# Patient Record
Sex: MALE | Race: WHITE | NOT HISPANIC OR LATINO | Employment: FULL TIME | URBAN - METROPOLITAN AREA
[De-identification: names, ages, dates, MRNs, and addresses within clinical notes are randomized per-mention and may not be internally consistent; named-entity substitution may affect disease eponyms.]

---

## 2021-03-17 ENCOUNTER — OCCUPATIONAL HEALTH (OUTPATIENT)
Dept: URGENT CARE | Facility: CLINIC | Age: 51
End: 2021-03-17

## 2021-03-17 DIAGNOSIS — Z13.9 ENCOUNTER FOR SCREENING: Primary | ICD-10-CM

## 2021-03-17 PROCEDURE — 84202 ASSAY RBC PROTOPORPHYRIN: CPT | Mod: S$GLB,,, | Performed by: PREVENTIVE MEDICINE

## 2021-03-17 PROCEDURE — 84202 LEAD STANDARD PROFILE, BLOOD: ICD-10-PCS | Mod: S$GLB,,, | Performed by: PREVENTIVE MEDICINE

## 2021-04-20 ENCOUNTER — OCCUPATIONAL HEALTH (OUTPATIENT)
Dept: URGENT CARE | Facility: CLINIC | Age: 51
End: 2021-04-20

## 2021-04-20 DIAGNOSIS — Z23 ENCOUNTER FOR IMMUNIZATION: Primary | ICD-10-CM

## 2021-04-20 PROCEDURE — 84202 ASSAY RBC PROTOPORPHYRIN: CPT | Mod: S$GLB,,, | Performed by: PREVENTIVE MEDICINE

## 2021-04-20 PROCEDURE — 84202 PR  ASSAY RBC PROTOPORPHYRIN: ICD-10-PCS | Mod: S$GLB,,, | Performed by: PREVENTIVE MEDICINE

## 2021-05-19 ENCOUNTER — OCCUPATIONAL HEALTH (OUTPATIENT)
Dept: URGENT CARE | Facility: CLINIC | Age: 51
End: 2021-05-19

## 2021-05-19 PROCEDURE — 84202 PR  ASSAY RBC PROTOPORPHYRIN: ICD-10-PCS | Mod: S$GLB,,, | Performed by: PREVENTIVE MEDICINE

## 2021-05-19 PROCEDURE — 84202 ASSAY RBC PROTOPORPHYRIN: CPT | Mod: S$GLB,,, | Performed by: PREVENTIVE MEDICINE

## 2022-02-16 ENCOUNTER — OCCUPATIONAL HEALTH (OUTPATIENT)
Dept: URGENT CARE | Facility: CLINIC | Age: 52
End: 2022-02-16

## 2022-02-16 DIAGNOSIS — Z13.9 ENCOUNTER FOR SCREENING: Primary | ICD-10-CM

## 2022-02-16 PROCEDURE — 84202 PR  ASSAY RBC PROTOPORPHYRIN: ICD-10-PCS | Mod: S$GLB,,, | Performed by: PREVENTIVE MEDICINE

## 2022-02-16 PROCEDURE — 99080 SPECIAL REPORTS OR FORMS: CPT | Mod: S$GLB,,, | Performed by: PREVENTIVE MEDICINE

## 2022-02-16 PROCEDURE — 94010 BREATHING CAPACITY TEST: CPT | Mod: S$GLB,,, | Performed by: PREVENTIVE MEDICINE

## 2022-02-16 PROCEDURE — 84202 ASSAY RBC PROTOPORPHYRIN: CPT | Mod: S$GLB,,, | Performed by: PREVENTIVE MEDICINE

## 2022-02-16 PROCEDURE — 99002 ON-SITE MASK FIT: ICD-10-PCS | Mod: S$GLB,,, | Performed by: PREVENTIVE MEDICINE

## 2022-02-16 PROCEDURE — 99080 OSHA QUESTIONNAIRE: ICD-10-PCS | Mod: S$GLB,,, | Performed by: PREVENTIVE MEDICINE

## 2022-02-16 PROCEDURE — 99002 DEVICE HANDLING PHYS/QHP: CPT | Mod: S$GLB,,, | Performed by: PREVENTIVE MEDICINE

## 2022-02-16 PROCEDURE — 94010 PULMONARY FUNCTION SCREENING (OCC MED_PHYSICALS): ICD-10-PCS | Mod: S$GLB,,, | Performed by: PREVENTIVE MEDICINE

## 2022-02-17 LAB
LEAD BLDV-MCNC: 3 UG/DL (ref 0–4)
ZPP RBC-MCNC: 17 UG/DL (ref 0–99)

## 2022-05-05 ENCOUNTER — OCCUPATIONAL HEALTH (OUTPATIENT)
Dept: URGENT CARE | Facility: CLINIC | Age: 52
End: 2022-05-05

## 2022-05-05 DIAGNOSIS — Z13.9 ENCOUNTER FOR SCREENING: Primary | ICD-10-CM

## 2022-05-05 PROCEDURE — 84202 ASSAY RBC PROTOPORPHYRIN: CPT | Mod: S$GLB,,, | Performed by: PREVENTIVE MEDICINE

## 2022-05-05 PROCEDURE — 84202 LEAD STANDARD PROFILE, BLOOD: ICD-10-PCS | Mod: S$GLB,,, | Performed by: PREVENTIVE MEDICINE

## 2022-06-17 ENCOUNTER — OCCUPATIONAL HEALTH (OUTPATIENT)
Dept: URGENT CARE | Facility: CLINIC | Age: 52
End: 2022-06-17

## 2022-06-17 DIAGNOSIS — Z13.9 ENCOUNTER FOR SCREENING: Primary | ICD-10-CM

## 2022-06-17 PROCEDURE — 84202 ASSAY RBC PROTOPORPHYRIN: CPT | Mod: S$GLB,,, | Performed by: PREVENTIVE MEDICINE

## 2022-06-17 PROCEDURE — 84202 LEAD STANDARD PROFILE, BLOOD: ICD-10-PCS | Mod: S$GLB,,, | Performed by: PREVENTIVE MEDICINE

## 2022-07-28 ENCOUNTER — OCCUPATIONAL HEALTH (OUTPATIENT)
Dept: URGENT CARE | Facility: CLINIC | Age: 52
End: 2022-07-28

## 2022-07-28 DIAGNOSIS — Z13.9 ENCOUNTER FOR SCREENING: Primary | ICD-10-CM

## 2022-07-28 PROCEDURE — 84202 ASSAY RBC PROTOPORPHYRIN: CPT | Mod: S$GLB,,,

## 2022-07-28 PROCEDURE — 84202 LEAD STANDARD PROFILE, BLOOD: ICD-10-PCS | Mod: S$GLB,,,

## 2023-01-23 ENCOUNTER — OCCUPATIONAL HEALTH (OUTPATIENT)
Dept: URGENT CARE | Facility: CLINIC | Age: 53
End: 2023-01-23

## 2023-01-23 DIAGNOSIS — Z13.9 ENCOUNTER FOR SCREENING: Primary | ICD-10-CM

## 2023-01-23 PROCEDURE — 84202 ASSAY RBC PROTOPORPHYRIN: CPT | Mod: S$GLB,,,

## 2023-01-23 PROCEDURE — 84202 LEAD STANDARD PROFILE, BLOOD: ICD-10-PCS | Mod: S$GLB,,,

## 2023-03-31 ENCOUNTER — OCCUPATIONAL HEALTH (OUTPATIENT)
Dept: URGENT CARE | Facility: CLINIC | Age: 53
End: 2023-03-31

## 2023-03-31 DIAGNOSIS — Z13.9 ENCOUNTER FOR SCREENING: Primary | ICD-10-CM

## 2023-03-31 PROCEDURE — 84202 ASSAY RBC PROTOPORPHYRIN: CPT | Mod: S$GLB,,, | Performed by: PHYSICIAN ASSISTANT

## 2023-03-31 PROCEDURE — 84202 LEAD STANDARD PROFILE, BLOOD: ICD-10-PCS | Mod: S$GLB,,, | Performed by: PHYSICIAN ASSISTANT

## 2023-04-26 ENCOUNTER — OCCUPATIONAL HEALTH (OUTPATIENT)
Dept: URGENT CARE | Facility: CLINIC | Age: 53
End: 2023-04-26

## 2023-04-26 DIAGNOSIS — Z13.9 ENCOUNTER FOR SCREENING: Primary | ICD-10-CM

## 2023-04-26 PROCEDURE — 99002 DEVICE HANDLING PHYS/QHP: CPT | Mod: S$GLB,,,

## 2023-04-26 PROCEDURE — 99080 OSHA QUESTIONNAIRE: ICD-10-PCS | Mod: S$GLB,,,

## 2023-04-26 PROCEDURE — 99080 SPECIAL REPORTS OR FORMS: CPT | Mod: S$GLB,,,

## 2023-04-26 PROCEDURE — 94010 BREATHING CAPACITY TEST: CPT | Mod: S$GLB,,,

## 2023-04-26 PROCEDURE — 94010 PULMONARY FUNCTION SCREENING (OCC MED PHYSICALS): ICD-10-PCS | Mod: S$GLB,,,

## 2023-04-26 PROCEDURE — 99002 MASK FIT-QUANTITATIVE: ICD-10-PCS | Mod: S$GLB,,,

## 2023-04-26 PROCEDURE — 84202 ASSAY RBC PROTOPORPHYRIN: CPT | Mod: S$GLB,,,

## 2023-04-26 PROCEDURE — 84202 LEAD STANDARD PROFILE, BLOOD: ICD-10-PCS | Mod: S$GLB,,,

## 2023-08-01 ENCOUNTER — OCCUPATIONAL HEALTH (OUTPATIENT)
Dept: URGENT CARE | Facility: CLINIC | Age: 53
End: 2023-08-01

## 2023-08-01 DIAGNOSIS — Z13.9 ENCOUNTER FOR SCREENING: Primary | ICD-10-CM

## 2023-08-01 PROCEDURE — 84202 ASSAY RBC PROTOPORPHYRIN: CPT | Mod: S$GLB,,,

## 2023-08-01 PROCEDURE — 84202 LEAD STANDARD PROFILE, BLOOD: ICD-10-PCS | Mod: S$GLB,,,

## 2024-01-18 ENCOUNTER — OCCUPATIONAL HEALTH (OUTPATIENT)
Dept: URGENT CARE | Facility: CLINIC | Age: 54
End: 2024-01-18
Payer: COMMERCIAL

## 2024-01-18 DIAGNOSIS — Z13.9 ENCOUNTER FOR SCREENING: Primary | ICD-10-CM

## 2024-01-18 PROCEDURE — 84202 ASSAY RBC PROTOPORPHYRIN: CPT | Mod: S$GLB,,, | Performed by: SURGERY

## 2024-03-21 ENCOUNTER — OCCUPATIONAL HEALTH (OUTPATIENT)
Dept: URGENT CARE | Facility: CLINIC | Age: 54
End: 2024-03-21

## 2024-03-21 PROCEDURE — 80305 DRUG TEST PRSMV DIR OPT OBS: CPT | Mod: S$GLB,,,

## 2024-03-21 PROCEDURE — 99499 UNLISTED E&M SERVICE: CPT | Mod: S$GLB,,,

## 2024-12-02 ENCOUNTER — OFFICE VISIT (OUTPATIENT)
Dept: PRIMARY CARE CLINIC | Facility: CLINIC | Age: 54
End: 2024-12-02
Payer: COMMERCIAL

## 2024-12-02 VITALS
WEIGHT: 138 LBS | OXYGEN SATURATION: 96 % | SYSTOLIC BLOOD PRESSURE: 130 MMHG | BODY MASS INDEX: 21.66 KG/M2 | HEIGHT: 67 IN | RESPIRATION RATE: 18 BRPM | HEART RATE: 92 BPM | DIASTOLIC BLOOD PRESSURE: 82 MMHG

## 2024-12-02 DIAGNOSIS — Z79.899 OTHER LONG TERM (CURRENT) DRUG THERAPY: ICD-10-CM

## 2024-12-02 DIAGNOSIS — Z13.6 SCREENING FOR CARDIOVASCULAR CONDITION: ICD-10-CM

## 2024-12-02 DIAGNOSIS — Z11.4 ENCOUNTER FOR SCREENING FOR HIV: ICD-10-CM

## 2024-12-02 DIAGNOSIS — R68.89 LACK OF INTEREST: ICD-10-CM

## 2024-12-02 DIAGNOSIS — R53.83 FATIGUE, UNSPECIFIED TYPE: ICD-10-CM

## 2024-12-02 DIAGNOSIS — Z76.89 ENCOUNTER TO ESTABLISH CARE: Primary | ICD-10-CM

## 2024-12-02 DIAGNOSIS — F33.1 MODERATE EPISODE OF RECURRENT MAJOR DEPRESSIVE DISORDER: ICD-10-CM

## 2024-12-02 DIAGNOSIS — R35.0 FREQUENCY OF URINATION: ICD-10-CM

## 2024-12-02 DIAGNOSIS — Z11.59 NEED FOR HEPATITIS C SCREENING TEST: ICD-10-CM

## 2024-12-02 PROCEDURE — 99999 PR PBB SHADOW E&M-EST. PATIENT-LVL IV: CPT | Mod: PBBFAC,,, | Performed by: STUDENT IN AN ORGANIZED HEALTH CARE EDUCATION/TRAINING PROGRAM

## 2024-12-02 PROCEDURE — 93010 ELECTROCARDIOGRAM REPORT: CPT | Mod: S$GLB,,, | Performed by: INTERNAL MEDICINE

## 2024-12-02 NOTE — PROGRESS NOTES
Assessment:       1. Encounter to establish care    2. Moderate episode of recurrent major depressive disorder    3. Lack of interest    4. Fatigue, unspecified type    5. Screening for cardiovascular condition    6. Need for hepatitis C screening test    7. Encounter for screening for HIV    8. Other long term (current) drug therapy    9. Frequency of urination           Plan:     Assessment & Plan    Evaluated chest pain, noting its characteristics make cardiac origin less likely but not impossible  Considered family history of heart disease and patient's age in recommending baseline EKG  Assessed acid reflux symptoms, recommending lifestyle modifications and OTC treatment  Evaluated for depression, noting symptoms of low energy, decreased appetite, and feelings of self-doubt  Considered thyroid dysfunction as a potential cause of fatigue and weight gain  Will check thyroid function, complete blood count, and metabolic panel to rule out underlying medical causes for symptoms  Discussed potential benefits of short-term counseling for addressing depression symptoms  Considered but decided against starting antidepressant medication at this time, given patient's preference and potential side effect of increased fatigue    GASTRO-ESOPHAGEAL REFLUX DISEASE:  - Explained common triggers for acid reflux: caffeine, carbonation, alcohol, tobacco, spicy foods, and red sauces.  - Discussed the mechanism of action for antacid medications, noting that they work better when taken preventatively before meals.  - Billy to identify and avoid personal acid reflux triggers.  - Continued famotidine (OTC antacid) as needed for acid reflux symptoms.  - Recommend taking before dinner to help with nighttime reflux symptoms.    DEPRESSION:  - Educated on the benefits of exercise for depression management, noting studies showing 150 minutes of moderate intensity exercise per week can be as effective as antidepressant medications.  -  Emphasized the importance of having short-term and long-term goals for mental well-being and motivation.  - Billy to continue regular exercise, aiming for 150 minutes of moderate intensity activity per week.  - Recommend setting short-term and long-term personal goals to improve mental outlook and motivation.  - Referred to counselor Miller Jon for short-term counseling (approximately 6 weeks) to address depression symptoms.    GENERAL HEALTH SCREENING:  - Ordered EKG, thyroid function test, complete blood count, metabolic panel, and PSA test.    FOLLOW-UP:  - Follow up after lab results are available to discuss findings and potential next steps.             Encounter to establish care    Moderate episode of recurrent major depressive disorder  -     Ambulatory referral/consult to Primary Care Behavioral Health (Non-Opioids); Future; Expected date: 12/09/2024  -     CBC Auto Differential; Future; Expected date: 12/02/2024  -     Hemoglobin A1C; Future; Expected date: 12/02/2024  -     Lipid Panel; Future; Expected date: 12/02/2024  -     Comprehensive Metabolic Panel; Future; Expected date: 12/02/2024  -     TSH; Future; Expected date: 12/02/2024  -     T4, Free; Future; Expected date: 12/02/2024  -     EKG 12-lead    Lack of interest    Fatigue, unspecified type  -     CBC Auto Differential; Future; Expected date: 12/02/2024  -     Hemoglobin A1C; Future; Expected date: 12/02/2024  -     Lipid Panel; Future; Expected date: 12/02/2024  -     Comprehensive Metabolic Panel; Future; Expected date: 12/02/2024  -     TSH; Future; Expected date: 12/02/2024  -     T4, Free; Future; Expected date: 12/02/2024  -     EKG 12-lead    Screening for cardiovascular condition    Need for hepatitis C screening test  -     Hepatitis C Antibody; Future; Expected date: 12/02/2024    Encounter for screening for HIV  -     HIV 1/2 Ag/Ab (4th Gen); Future; Expected date: 12/02/2024    Other long term (current) drug therapy  -     CBC  Auto Differential; Future; Expected date: 12/02/2024  -     Hemoglobin A1C; Future; Expected date: 12/02/2024  -     Lipid Panel; Future; Expected date: 12/02/2024  -     Comprehensive Metabolic Panel; Future; Expected date: 12/02/2024  -     TSH; Future; Expected date: 12/02/2024  -     T4, Free; Future; Expected date: 12/02/2024  -     EKG 12-lead    Frequency of urination  -     PSA, Screening; Future; Expected date: 12/02/2024                This note was generated with the assistance of ambient listening technology. Verbal consent was obtained by the patient and accompanying visitor(s) for the recording of patient appointment to facilitate this note. I attest to having reviewed and edited the generated note for accuracy, though some syntax or spelling errors may persist. Please contact the author of this note for any clarification.      Subjective:           Patient ID: Billy Arevalo   Age:  54 y.o.  Sex: male     Chief Complaint:   Establish Care      History of Present Illness:    Billy Arevalo is a 54 y.o. male who presents today with a chief complaint of Establish Care  .    Has less interest.  Does enjoy fishing, but not much else.  Watches TV and does not get out.    Little interest or pleasure in doing things: Several days  Feeling down, depressed, or hopeless: Nearly every day  Trouble falling or staying asleep, or sleeping too much: Nearly every day  Feeling tired or having little energy: Nearly every day  Poor appetite or overeating: Nearly every day  Feeling bad about yourself - or that you are a failure or have let yourself or your family down: Not at all  Trouble concentrating on things, such as reading the newspaper or watching television: Nearly every day  Moving or speaking so slowly that other people could have noticed. Or the opposite - being so fidgety or restless that you have been moving around a lot more than usual: Nearly every day  Thoughts that you would be better off  dead, or of hurting yourself in some way: Not at all  PHQ-9 Total Score: 19     PHQ-9 Total Score: 19    Does get out to fish and exercise. Feels well when active.  But when is not around family will self loath.    History of Present Illness    CHIEF COMPLAINT:  Billy presents today with concerns about depression and chest pain.    DEPRESSION:  He reports experiencing depression characterized by low energy and interest, feeling tired and having low energy nearly every day. This has affected his appetite, leading to eating less. He feels bad about himself every day and has been more slow and lethargic daily.    CHEST PAIN AND GERD:  He experiences chest pain that occurs at rest and lasts a few seconds, without associated numbness, tingling, or nausea. He reports more frequent heartburn and is taking OTC famotidine, which is effective if taken regularly. He discontinued coffee consumption about a month ago due to acid reflux and stomach issues, noting some improvement in his reflux symptoms. He now only drinks coffee occasionally, approximately once every two weeks.    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 He stopped working at this job at the end of March and started a new position at [employer] on , no longer requiring lead level monitoring.    SOCIAL HISTORY:  He is a former smoker who quit on  after 38 years. He also quit marijuana use on  after 38 years. He drinks whiskey about once a week and is sexually active with his wife.    FAMILY HISTORY:  His mother passed away from COPD and lung cancer. His father  of a heart attack at the age of 54. Both maternal grandparents had diabetes, and his mother was borderline diabetic.    SURGICAL HISTORY:  He reports a history of tonsillectomy with no other surgical procedures  "mentioned.      ROS:  General: +fatigue, +loss of appetite  Cardiovascular: +chest pain  Gastrointestinal: -nausea, +heartburn  Neurological: -numbness, -tingling  Psychiatric: +depression           Review of Systems   Constitutional:  Positive for fatigue. Negative for fever.   HENT: Negative.  Negative for congestion, rhinorrhea, sinus pressure and sinus pain.    Eyes: Negative.    Respiratory: Negative.  Negative for cough, chest tightness, shortness of breath and wheezing.    Cardiovascular:  Positive for chest pain (at time, mild but some concerns.). Negative for palpitations and leg swelling.   Gastrointestinal: Negative.  Negative for abdominal distention, constipation, diarrhea, nausea and vomiting.   Endocrine: Negative.    Genitourinary: Negative.  Negative for difficulty urinating, frequency and urgency.   Musculoskeletal: Negative.  Negative for arthralgias, gait problem and joint swelling.   Skin: Negative.    Allergic/Immunologic: Negative for food allergies.   Neurological:  Negative for weakness and headaches.   Psychiatric/Behavioral:  Positive for decreased concentration and dysphoric mood. Negative for sleep disturbance. The patient is nervous/anxious.            Objective:        Vitals:    12/02/24 1358   BP: 130/82   BP Location: Right arm   Patient Position: Sitting   Pulse: 92   Resp: 18   SpO2: 96%   Weight: 62.6 kg (138 lb 0.1 oz)   Height: 5' 7" (1.702 m)       Body mass index is 21.62 kg/m².      Physical Exam  Constitutional:       Appearance: Normal appearance. He is not toxic-appearing.      Comments: As per BMI.   HENT:      Head: Normocephalic and atraumatic.      Right Ear: External ear normal.      Left Ear: External ear normal.      Nose: No congestion.      Mouth/Throat:      Mouth: Mucous membranes are moist.      Pharynx: Oropharynx is clear.   Eyes:      Extraocular Movements: Extraocular movements intact.      Conjunctiva/sclera: Conjunctivae normal.   Cardiovascular:      " "Rate and Rhythm: Normal rate and regular rhythm.      Heart sounds: No murmur heard.  Pulmonary:      Effort: Pulmonary effort is normal. No respiratory distress.      Breath sounds: No wheezing.   Abdominal:      General: Bowel sounds are normal.      Palpations: Abdomen is soft.   Musculoskeletal:         General: No swelling.      Cervical back: Normal range of motion.   Skin:     General: Skin is warm.      Capillary Refill: Capillary refill takes less than 2 seconds.      Coloration: Skin is not jaundiced.   Neurological:      General: No focal deficit present.      Mental Status: He is alert and oriented to person, place, and time.      Motor: No weakness.   Psychiatric:         Mood and Affect: Mood normal.         Physical Exam    Cardiovascular: Heart sounds even, no big murmurs.  Vitals: BMI: 21.6. Blood pressure looks fine.               No past medical history on file.    No results found for: "NA", "K", "CL", "CO2", "BUN", "CREATININE", "GLUCOSE", "ANIONGAP"  No results found for: "HGBA1C"  No results found for: "BNP", "BNPTRIAGEBLO"    No results found for: "WBC", "HGB", "HCT", "PLT", "GRAN"  No results found for: "CHOL", "HDL", "LDLCALC", "TRIG"     No outpatient encounter medications on file as of 12/2/2024.     No facility-administered encounter medications on file as of 12/2/2024.            "

## 2024-12-03 ENCOUNTER — TELEPHONE (OUTPATIENT)
Dept: BEHAVIORAL HEALTH | Facility: CLINIC | Age: 54
End: 2024-12-03
Payer: COMMERCIAL

## 2024-12-03 LAB
OHS QRS DURATION: 80 MS
OHS QTC CALCULATION: 422 MS

## 2024-12-03 NOTE — PROGRESS NOTES
Behavioral Health Community Health Worker  Initial Assessment  Completed by:  Fernanda Kaur     Date:  12/3/2024    Patient Enrollment in Behavioral Health Program:  Patient verbalized understanding of Behavioral Health Integration services to include:  Patient understands that CHW, LCSW, PharmD and consulting Psychiatrist are members of the care team working collaboratively with his/her primary care provider: Yes  Patient understands that activation of their [facility] patient portal account is required for accessing the full scope of team services: Yes  Patient understands that some counseling sessions may occur via video: Yes  Clinic visits with the psychiatrist may be subject to a co-pay based on your insurance: Yes  Patient consents to enroll in BHI program: Yes    Assessments     Single Item Health Literacy Scale:  How often do you need to have someone help you read instructions, pamphlets or other written material from your doctor or pharmacy?: Never    Promis 10:  Promis 10 Responses  In general, would you say your health is: Good  In general, would you say your quality of life is: Very good  In general, how would you rate your physical health?: Good  In general, how would you rate your mental health, including your mood and your ability to think?: Fair  In general, how would you rate your satisfaction with your social activities and relationships?: Good  In general, please rate how well you carry out your usual social activities and roles. (This includes activities at home, at work and in your community, and responsibilities as a parent, child, spouse, employee, friend, etc.): Good  To what extent are you able to carry out your everyday physical activities such as walking, climbing stairs, carrying groceries, or moving a chair? : Mostly  How often have you been bothered by emotional problems such as feeling anxious, depressed or irritable?: Often  In the past 7 days, how would you rate your fatigue on  average?: Severe  In the past 7 days, on a scale of 0 to 10 (where 0 is no pain and 10 is the worst pain imaginable) how would you rate your pain on average?: 0  Global Physical Health: 11  Global Mental health Score: 13    Depression PHQ:      2024     2:53 PM   PHQ9   Little interest or pleasure in doing things 1   Feeling down, depressed, or hopeless 3   Trouble falling or staying asleep, or sleeping too much 3   Feeling tired or having little energy 3   Poor appetite or overeating 3   Feeling bad about yourself - or that you are a failure or have let yourself or your family down 0   Trouble concentrating on things, such as reading the newspaper or watching television 3   Moving or speaking so slowly that other people could have noticed. Or the opposite - being so fidgety or restless that you have been moving around a lot more than usual 3   Thoughts that you would be better off dead, or of hurting yourself in some way 0   PHQ-9 Total Score 19         Generalized Anxiety Disorder 7-Item Scale:      12/3/2024     4:26 PM   GAD7   1. Feeling nervous, anxious, or on edge? 1   2. Not being able to stop or control worrying? 2   3. Worrying too much about different things? 2   4. Trouble relaxing? 1   5. Being so restless that it is hard to sit still? 1   6. Becoming easily annoyed or irritable? 2   7. Feeling afraid as if something awful might happen? 2   8. If you checked off any problems, how difficult have these problems made it for you to do your work, take care of things at home, or get along with other people? 1   PASQUALE-7 Score 11       History     Social History     Socioeconomic History    Marital status:    Tobacco Use    Smoking status: Former     Current packs/day: 0.00     Average packs/day: 1 pack/day for 39.1 years (39.1 ttl pk-yrs)     Types: Cigarettes     Start date: 12/15/1984     Quit date: 2024     Years since quittin.9    Smokeless tobacco: Never   Substance and Sexual Activity  "   Alcohol use: Yes     Alcohol/week: 1.0 standard drink of alcohol     Types: 1 Shots of liquor per week     Comment: once a week    Drug use: Not Currently     Comment: Marijuana, stopped Dec 25, 2023    Sexual activity: Yes     Partners: Female     Comment: wife       Call Summary     Patient was referred to the BHI (Non-opioid) program by Primary Care Provider, Dr. Kamaljit Guidry. CHW contacted Billy Arevalo who reports depression and anxiety that limits his activities of daily living (ADLs).   Patient scored "19" on the PHQ8 and "11" on the PASQUALE 7. Based on these scores patient is eligible for the Behavioral Health Integration (Non-opioid) Program. CHW completed the intake and scheduled an in-person new pt appointment for patient with Miller Gonzalez LPC on Wednesday, 12/18/2024 at 10:00am. Notice sent to PCP.    "

## 2024-12-09 ENCOUNTER — TELEPHONE (OUTPATIENT)
Dept: PRIMARY CARE CLINIC | Facility: CLINIC | Age: 54
End: 2024-12-09
Payer: COMMERCIAL

## 2024-12-09 NOTE — TELEPHONE ENCOUNTER
----- Message from Kamaljit Guidry MD sent at 12/6/2024  6:26 PM CST -----  Patient's labs are reassuring.  Normal PSA.  Normal thyroid function.  Normal kidney and liver function.  Normal blood counts.    Borderline elevated cholesterol, would recommend Mediterranean diet.

## 2024-12-17 ENCOUNTER — TELEPHONE (OUTPATIENT)
Dept: BEHAVIORAL HEALTH | Facility: CLINIC | Age: 54
End: 2024-12-17
Payer: COMMERCIAL

## 2024-12-17 NOTE — PROGRESS NOTES
CHW reached out to new pt to remind him of in-person appointment with Miller Gonzalez LPC tomorrow. No answer, voice mailbox is not set up yet. Unable to LVM of the appointment. Patient portal is not set up.

## 2024-12-17 NOTE — PROGRESS NOTES
"Primary Care Behavioral Health Integration: Initial  Date:  12/24/2024  Referral Source:  Kamaljit Guidry MD  Length of Appointment:  53 minutes spent face-to-face and 13 minutes spent in non face-to-face clinical care.    Chief Complaint/Reason for Encounter:  Depression    History of Present Illness: Billy Arevalo, a 54 y.o. male referred by Kamaljit Guidry MD.  Patient was seen, examined and chart was reviewed. Met with patient.       LPC provided a description of the [facility] program, reviewed confidentialty and limits to confidentiality, and discussed safety planning in the event patient experiences suicidal or homicidal ideation or plans to harm himself.  Patient began this Initial Assessment by stating he has a history of depression which includes symptoms of avolition and lethargy.  Patient explained he has a lot of idle time at home when he is not working, and the idle time frustrates him because he engages in negative thinking.  He reported decreased appetite.  Patient noted he used marijuana for 38 years, and he smoked cigarettes for 38 years.  He has been quit marijuana for 1 year, and he has been quit cigarettes for 1 year.  He got the motivation to quit both when he mother passed away from COPD.  Reported his wife has "depression and anxiety, and I do not like what medication is doing to her."  Patient did, however, state he would be open to trying a low dose SSRI or SNRI.  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  Stated he likes his job, and he likes staying busy.  Patient reported he consumes whiskey whenever he goes fishing.  He enjoys going fishing.  Noted when he is home, he tends to sleep most of the day.  Reported symptoms include anhedonia, insomnia, and worthlessness/guilt.  Denies anxiety, suicidal ideation, homicidal ideation, visual hallucinations, and auditory hallucinations.  LPC attempted to print out and hand patient Tool 1 - Identification of " Triggers to Anxiety worksheet of the CBT Toolbox workbook but was unable to do so.  [facility] encouraged patient to set up his [facility] patient portal account.  Patient agreed to do so.  Patient will contact W, Fernanda Kaur, to schedule his follow-up appointment with [facility].        No past medical history on file.    No current outpatient medications on file.    Current symptoms:  Depression Symptoms: anhedonia, insomnia, and worthlessness/guilt.  Anxiety Symptoms: denies.  Sleep Difficulties: Patient reports difficulty falling asleep and Patient reports non-restful sleep  Manic Symptoms:  denies.  Psychosis: denies .    Risk assessment:  Patient reports no suicidal ideation  Patient reports no homicidal ideation  Patient reports no self-injurious behavior  Patient reports no violent behavior    Patient advised to call [phone] or present the the nearest ED if they experience suicidal or homicidal ideation, plan or intent.      Psychiatric History:  Diagnosis:    Current Psychiatric Medication: No They are interested in medication changes.   Medication Trial History:  Medication Trials: No    Outpatient Treatment: No   Inpatient Treatment: No   Suicide Attempts: No   Access to Firearms: Patient stated he does have access to firearms, but he would not use them to harm himself.     History of Trauma: No   Family Psychiatric History: No     Current and Past Substance Use:  Alcohol: Hard liquor - one fifth of [place], 3 drinks in one sitting   Drugs: Denied.   Nicotine: Quit 1 year    Caffeine:  Soft drinks every other day.       Mental Status Exam  General Appearance:  appears stated age, neatly dressed, well groomed   Speech: normal tone, normal rate, normal pitch, normal volume      Level of Cooperation: cooperative      Thought Processes: linear, logical, goal-directed   Mood: depressed      Thought Content: {relevant and appropriate   Affect: congruent and appropriate   Orientation: Oriented x4    Memory/Attention/Concentration: No gross cognitive deficits made evident during conversation   Judgment & Insight: good   Language  intact          No data to display                    12/3/2024     4:26 PM   GAD7   1. Feeling nervous, anxious, or on edge? 1   2. Not being able to stop or control worrying? 2   3. Worrying too much about different things? 2   4. Trouble relaxing? 1   5. Being so restless that it is hard to sit still? 1   6. Becoming easily annoyed or irritable? 2   7. Feeling afraid as if something awful might happen? 2   8. If you checked off any problems, how difficult have these problems made it for you to do your work, take care of things at home, or get along with other people? 1   PASQUALE-7 Score 11           12/2/2024   PHQ-9 Depression Patient Health Questionnaire   Over the last two weeks how often have you been bothered by little interest or pleasure in doing things 1   Over the last two weeks how often have you been bothered by feeling down, depressed or hopeless 3   Over the last two weeks how often have you been bothered by trouble falling or staying asleep, or sleeping too much 3   Over the last two weeks how often have you been bothered by feeling tired or having little energy 3   Over the last two weeks how often have you been bothered by a poor appetite or overeating 3   Over the last two weeks how often have you been bothered by feeling bad about yourself - or that you are a failure or have let yourself or your family down 3   Over the last two weeks how often have you been bothered by trouble concentrating on things, such as reading the newspaper or watching television 0   Over the last two weeks how often have you been bothered by moving or speaking so slowly that other people could have noticed. 3   Over the last two weeks how often have you been bothered by thoughts that you would be better off dead, or of hurting yourself 0       Impression: Initial appointment focused on  gathering history, identifying treatment goals and developing a treatment plan.      Patient experiences anhedonia, depressed mood, insomnia, feelings of worthlessness/guilt, and fatigue as evidenced by avolition, lethargy, negative intrusive thoughts, and irritability.  These symptoms are making it difficult for patient to function effectively.      Diagnosis:  No diagnosis found.    Treatment Goals and Plan:   Depression: reducing excessive guilt, reducing fatigue, and reducing negative automatic thoughts    Future treatment will utilize CBT, Problem-solving Therapy, and Solution-focused Therapy.      Return to Clinic:  Patient stated he will contact DAJA, Fernanda Kaur, to schedule follow-up appointment with JACQUELINE.

## 2024-12-17 NOTE — PROGRESS NOTES
CHW returned call to pt who is working tomorrow morning and can't keep his appt. Pt is rescheduled for 12/24/2024 at 10:00am with Miller Gonzalez LPC.

## 2024-12-23 ENCOUNTER — TELEPHONE (OUTPATIENT)
Dept: BEHAVIORAL HEALTH | Facility: CLINIC | Age: 54
End: 2024-12-23
Payer: COMMERCIAL

## 2024-12-23 NOTE — PROGRESS NOTES
CHW reached out to new pt to remind him of in-person appointment with Miller Gonzalez LPC tomorrow. No answer, unable to leave a voice message, not set up yet. No patient portal.

## 2024-12-24 ENCOUNTER — OFFICE VISIT (OUTPATIENT)
Dept: BEHAVIORAL HEALTH | Facility: CLINIC | Age: 54
End: 2024-12-24
Payer: COMMERCIAL

## 2024-12-24 DIAGNOSIS — F33.1 MODERATE EPISODE OF RECURRENT MAJOR DEPRESSIVE DISORDER: Primary | ICD-10-CM

## 2024-12-24 PROCEDURE — 99999 PR PBB SHADOW E&M-EST. PATIENT-LVL I: CPT | Mod: PBBFAC,,, | Performed by: COUNSELOR

## 2024-12-24 PROCEDURE — 90791 PSYCH DIAGNOSTIC EVALUATION: CPT | Mod: S$GLB,,, | Performed by: COUNSELOR

## 2024-12-24 PROCEDURE — 3044F HG A1C LEVEL LT 7.0%: CPT | Mod: CPTII,S$GLB,, | Performed by: COUNSELOR

## 2025-01-08 ENCOUNTER — TELEPHONE (OUTPATIENT)
Dept: PRIMARY CARE CLINIC | Facility: CLINIC | Age: 55
End: 2025-01-08
Payer: COMMERCIAL

## 2025-01-08 NOTE — TELEPHONE ENCOUNTER
"----- Message from Kamaljit Guidry MD sent at 1/7/2025  6:10 PM CST -----  Regarding: offer patient for an appt  Please offer patient for an appt sooner than his current f/u appt.   In appt reason put, "Consider Prozac 10/20mg or Zoloft per Ambrocio"    Thanks, SB  ----- Message -----  From: Addie Albrecht MD  Sent: 1/7/2025   2:54 PM CST  To: MD Dr. Chao Tejada,    Please see my addendum from today's [facility] meeting with Miller, this patient's [facility] therapist.  We will encourage this patient to schedule follow up with you to start medication.  Please reach out to me with any concerns.    Addie Albrecht  "

## 2025-01-14 ENCOUNTER — TELEPHONE (OUTPATIENT)
Dept: BEHAVIORAL HEALTH | Facility: CLINIC | Age: 55
End: 2025-01-14
Payer: COMMERCIAL

## 2025-01-14 NOTE — PROGRESS NOTES
CHW reached out to pt to offer to reschedule his appointment with Miller Gonzalez LPC  from 12/24/2024 due to openings tomorrow, 1/15/2025 at 9am and 12:30pm. No answer, no voice mailbox available, unable to LVM.  Sent portal message with some available times to reschedule an appt.

## 2025-02-24 ENCOUNTER — TELEPHONE (OUTPATIENT)
Dept: BEHAVIORAL HEALTH | Facility: CLINIC | Age: 55
End: 2025-02-24
Payer: COMMERCIAL

## 2025-02-24 NOTE — PROGRESS NOTES
Contacted patient to offer to reschedule a I appointment for patient with Miller Gonzalez LPC. No answer, no voice mailbox is set up, unable to LVM. Sent portal message with upcoming appts available.

## 2025-03-03 ENCOUNTER — OFFICE VISIT (OUTPATIENT)
Dept: PRIMARY CARE CLINIC | Facility: CLINIC | Age: 55
End: 2025-03-03
Payer: COMMERCIAL

## 2025-03-03 VITALS
DIASTOLIC BLOOD PRESSURE: 68 MMHG | WEIGHT: 142.19 LBS | BODY MASS INDEX: 22.32 KG/M2 | HEART RATE: 62 BPM | RESPIRATION RATE: 16 BRPM | SYSTOLIC BLOOD PRESSURE: 130 MMHG | TEMPERATURE: 98 F | HEIGHT: 67 IN | OXYGEN SATURATION: 97 %

## 2025-03-03 DIAGNOSIS — R53.83 FATIGUE, UNSPECIFIED TYPE: ICD-10-CM

## 2025-03-03 DIAGNOSIS — R05.8 POST-VIRAL COUGH SYNDROME: ICD-10-CM

## 2025-03-03 DIAGNOSIS — F17.210 CIGARETTE SMOKER: ICD-10-CM

## 2025-03-03 DIAGNOSIS — F33.1 MODERATE EPISODE OF RECURRENT MAJOR DEPRESSIVE DISORDER: Primary | ICD-10-CM

## 2025-03-03 DIAGNOSIS — Z12.11 COLON CANCER SCREENING: ICD-10-CM

## 2025-03-03 PROCEDURE — 3078F DIAST BP <80 MM HG: CPT | Mod: CPTII,S$GLB,, | Performed by: STUDENT IN AN ORGANIZED HEALTH CARE EDUCATION/TRAINING PROGRAM

## 2025-03-03 PROCEDURE — 3075F SYST BP GE 130 - 139MM HG: CPT | Mod: CPTII,S$GLB,, | Performed by: STUDENT IN AN ORGANIZED HEALTH CARE EDUCATION/TRAINING PROGRAM

## 2025-03-03 PROCEDURE — 3008F BODY MASS INDEX DOCD: CPT | Mod: CPTII,S$GLB,, | Performed by: STUDENT IN AN ORGANIZED HEALTH CARE EDUCATION/TRAINING PROGRAM

## 2025-03-03 PROCEDURE — 1160F RVW MEDS BY RX/DR IN RCRD: CPT | Mod: CPTII,S$GLB,, | Performed by: STUDENT IN AN ORGANIZED HEALTH CARE EDUCATION/TRAINING PROGRAM

## 2025-03-03 PROCEDURE — 99214 OFFICE O/P EST MOD 30 MIN: CPT | Mod: S$GLB,,, | Performed by: STUDENT IN AN ORGANIZED HEALTH CARE EDUCATION/TRAINING PROGRAM

## 2025-03-03 PROCEDURE — 99999 PR PBB SHADOW E&M-EST. PATIENT-LVL IV: CPT | Mod: PBBFAC,,, | Performed by: STUDENT IN AN ORGANIZED HEALTH CARE EDUCATION/TRAINING PROGRAM

## 2025-03-03 PROCEDURE — 1159F MED LIST DOCD IN RCRD: CPT | Mod: CPTII,S$GLB,, | Performed by: STUDENT IN AN ORGANIZED HEALTH CARE EDUCATION/TRAINING PROGRAM

## 2025-03-03 RX ORDER — PREDNISONE 10 MG/1
TABLET ORAL
Qty: 7 TABLET | Refills: 0 | Status: SHIPPED | OUTPATIENT
Start: 2025-03-03

## 2025-03-03 RX ORDER — FAMOTIDINE 20 MG/1
20 TABLET, FILM COATED ORAL DAILY PRN
COMMUNITY

## 2025-03-03 NOTE — PATIENT INSTRUCTIONS
IMPRESSION:  - Assessed persistent cough and mild lymphadenopathy, likely post-viral  - Considered smoking history (38 years, quit 14 months ago) in relation to current symptoms  - Evaluated need for low-dose CT given extensive smoking history  - Decided on short course of oral steroids to address lingering cough symptoms  - Reviewed recent lab results, noting borderline cholesterol levels but low overall cardiovascular risk (ASCVD score 6%)    PLAN SUMMARY:  - Prescribed prednisone 20 m tablets daily for 2 days, then 1 tablet daily for 3 days  - Ordered low-dose CT of the chest  - Billy to follow up for CT scheduling  - Educated patient on medication side effects and potential persistence of enlarged lymph nodes    R05.8 POST-VIRAL COUGH SYNDROME:  - Educated patient about potential persistence of enlarged lymph nodes post-illness.  - Prescribed prednisone 20 m tablets orally daily for 2 days, then 1 tablet daily for 3 days (7 tablets total).  - Informed patient about potential side effects, including insomnia.  - Ordered a low-dose CT of the chest and instructed patient to follow up for scheduling.     MDD:   - hx depression, met with counselor and found helpful.   - printed message from patient [employer] from 25 for him to get contact number for Fernanda Kaur ([phone]) to schedule f/u appt since patient is interested.    GERD:   - using Famotidine 20mg about every other day.  Helping GERD.

## 2025-03-03 NOTE — PROGRESS NOTES
Assessment:       1. Moderate episode of recurrent major depressive disorder    2. Post-viral cough syndrome    3. Fatigue, unspecified type    4. Cigarette smoker    5. Colon cancer screening           Plan:     Assessment & Plan    F33.1 Moderate episode of recurrent major depressive disorder  F17.210 Cigarette smoker  R05.8 Post-viral cough syndrome    IMPRESSION:  - Assessed persistent cough and mild lymphadenopathy, likely post-viral  - Considered smoking history (38 years, quit 14 months ago) in relation to current symptoms  - Evaluated need for low-dose CT given extensive smoking history  - Decided on short course of oral steroids to address lingering cough symptoms  - Reviewed recent lab results, noting borderline cholesterol levels but low overall cardiovascular risk (ASCVD score 6%)    PLAN SUMMARY:  - Prescribed prednisone 20 m tablets daily for 2 days, then 1 tablet daily for 3 days  - Ordered low-dose CT of the chest  - Billy to follow up for CT scheduling  - Educated patient on medication side effects and potential persistence of enlarged lymph nodes    R05.8 POST-VIRAL COUGH SYNDROME:  - Educated patient about potential persistence of enlarged lymph nodes post-illness.  - Prescribed prednisone 20 m tablets orally daily for 2 days, then 1 tablet daily for 3 days (7 tablets total).  - Informed patient about potential side effects, including insomnia.  - Ordered a low-dose CT of the chest and instructed patient to follow up for scheduling.     MDD:   - hx depression, met with counselor and found helpful.   - printed message from patient [employer] messenger from 25 for him to get contact number for Fernanda Kaur ([phone]) to schedule f/u appt since patient is interested.    GERD:   - using Famotidine 20mg about every other day.  Helping GERD.             Moderate episode of recurrent major depressive disorder    Post-viral cough syndrome  -     predniSONE (DELTASONE) 10 MG tablet; Take 2  tabs by mouth for 2 days, then 1 tab by mouth for 3 days.  Dispense: 7 tablet; Refill: 0    Fatigue, unspecified type    Cigarette smoker  -     CT Chest Lung Screening Low Dose; Future; Expected date: 03/03/2025    Colon cancer screening  -     Case Request Endoscopy: COLONOSCOPY, SCREENING, LOW RISK PATIENT                This note was generated with the assistance of ambient listening technology. Verbal consent was obtained by the patient and accompanying visitor(s) for the recording of patient appointment to facilitate this note. I attest to having reviewed and edited the generated note for accuracy, though some syntax or spelling errors may persist. Please contact the author of this note for any clarification.      Subjective:           Patient ID: Billy Arevalo   Age:  54 y.o.  Sex: male     Chief Complaint:   Follow-up (3 month)      History of Present Illness:    Billy Arevalo is a 54 y.o. male who presents today with a chief complaint of Follow-up (3 month)  .    Has been having some raspy cough for last few weeks.  No fever or weakness.          The 10-year ASCVD risk score (Leatha DK, et al., 2019) is: 6.1%    Values used to calculate the score:      Age: 54 years      Sex: Male      Is Non- : No      Diabetic: No      Tobacco smoker: No      Systolic Blood Pressure: 130 mmHg      Is BP treated: No      HDL Cholesterol: 42 mg/dL      Total Cholesterol: 227 mg/dL      History of Present Illness    CHIEF COMPLAINT:  Billy presents today for follow-up and persistent cough    RESPIRATORY SYMPTOMS:  He reports experiencing a persistent raspy cough with white phlegm for the past couple of weeks. He denies fever, weakness, or sore throat. He has been using honey lemon drops for symptom relief.    GERD:  He reports improvement in GERD symptoms. He takes famotidine 20mg every other day prophylactically before experiencing reflux symptoms, which has been effective. He no  "longer requires Tums or other chewable antacids.    SOCIAL HISTORY:  He quit smoking 14 months ago after a 38-year history of tobacco use.    DIET:  He reports poor vegetable intake in his diet.    LABS:  Complete blood count including white blood cells and platelets were normal. Thyroid function tests were normal. Kidney and liver function tests were normal with normal electrolytes. Cholesterol panel showed borderline levels with HDL at 42 mg/dL and LDL at 146 mg/dL.      ROS:  General: -fever  ENT: -sore throat  Respiratory: +cough  Gastrointestinal: -heartburn  Neurological: -weakness           Review of Systems   Constitutional:  Positive for fatigue. Negative for fever.   HENT: Negative.  Negative for congestion, rhinorrhea, sinus pressure and sinus pain.    Eyes: Negative.    Respiratory:  Positive for cough. Negative for chest tightness, shortness of breath and wheezing.    Cardiovascular:  Positive for chest pain (at time, mild but some concerns.). Negative for palpitations and leg swelling.   Gastrointestinal: Negative.  Negative for abdominal distention, constipation, diarrhea, nausea and vomiting.   Endocrine: Negative.    Genitourinary: Negative.  Negative for difficulty urinating, frequency and urgency.   Musculoskeletal: Negative.  Negative for arthralgias, gait problem and joint swelling.   Skin: Negative.    Allergic/Immunologic: Negative for food allergies.   Neurological:  Negative for weakness and headaches.   Psychiatric/Behavioral:  Positive for decreased concentration and dysphoric mood. Negative for sleep disturbance. The patient is nervous/anxious.            Objective:        Vitals:    03/03/25 1327   BP: 130/68   BP Location: Left arm   Patient Position: Sitting   Pulse: 62   Resp: 16   Temp: 98.3 °F (36.8 °C)   TempSrc: Oral   SpO2: 97%   Weight: 64.5 kg (142 lb 3.2 oz)   Height: 5' 7" (1.702 m)       Body mass index is 22.27 kg/m².      Physical Exam  Constitutional:       General: He is " not in acute distress.     Appearance: Normal appearance. He is not ill-appearing or toxic-appearing.      Comments: As per BMI.   HENT:      Head: Normocephalic and atraumatic.      Right Ear: External ear normal.      Left Ear: External ear normal.      Nose: No congestion.      Mouth/Throat:      Mouth: Mucous membranes are moist.      Pharynx: Oropharynx is clear. No posterior oropharyngeal erythema.   Eyes:      Extraocular Movements: Extraocular movements intact.      Conjunctiva/sclera: Conjunctivae normal.   Cardiovascular:      Rate and Rhythm: Normal rate and regular rhythm.      Heart sounds: No murmur heard.  Pulmonary:      Effort: Pulmonary effort is normal. No respiratory distress.      Breath sounds: No wheezing.   Abdominal:      General: Bowel sounds are normal.      Palpations: Abdomen is soft.   Musculoskeletal:         General: No swelling.      Cervical back: Normal range of motion.   Lymphadenopathy:      Cervical: Cervical adenopathy present.   Skin:     General: Skin is warm.      Capillary Refill: Capillary refill takes less than 2 seconds.      Coloration: Skin is not jaundiced.   Neurological:      General: No focal deficit present.      Mental Status: He is alert and oriented to person, place, and time.      Motor: No weakness.   Psychiatric:         Mood and Affect: Mood normal.         Physical Exam    Neck: Posterior auricular lymph nodes are fine. Anterior cervical node on the right is a little bit enlarged.  Ears: Ear canal slightly pinker than normal. Ear drum looks perfect.  Cardiovascular: Good radial pulses. Heart sounds okay.  Lungs: Lungs sound clear. No wheezing or tightness in lungs.               Past Medical History[1]    Lab Results   Component Value Date     12/06/2024    K 4.3 12/06/2024     12/06/2024    CO2 24 12/06/2024    BUN 14 12/06/2024    CREATININE 1.2 12/06/2024    ANIONGAP 10 12/06/2024     Lab Results   Component Value Date    HGBA1C 5.5  "12/06/2024     No results found for: "BNP", "BNPTRIAGEBLO"    Lab Results   Component Value Date    WBC 8.37 12/06/2024    HGB 16.2 12/06/2024    HCT 48.7 12/06/2024     12/06/2024    GRAN 5.4 12/06/2024    GRAN 64.2 12/06/2024     Lab Results   Component Value Date    CHOL 227 (H) 12/06/2024    HDL 42 12/06/2024    LDLCALC 146.8 12/06/2024    TRIG 191 (H) 12/06/2024        Encounter Medications[2]              [1] No past medical history on file.  [2]   Outpatient Encounter Medications as of 3/3/2025   Medication Sig Dispense Refill    famotidine (PEPCID) 20 MG tablet Take 20 mg by mouth daily as needed (using to prevent heart burn, appx every other day.).      predniSONE (DELTASONE) 10 MG tablet Take 2 tabs by mouth for 2 days, then 1 tab by mouth for 3 days. 7 tablet 0     No facility-administered encounter medications on file as of 3/3/2025.     "

## 2025-03-05 ENCOUNTER — PATIENT MESSAGE (OUTPATIENT)
Dept: GASTROENTEROLOGY | Facility: CLINIC | Age: 55
End: 2025-03-05
Payer: COMMERCIAL

## 2025-03-05 ENCOUNTER — TELEPHONE (OUTPATIENT)
Dept: GASTROENTEROLOGY | Facility: CLINIC | Age: 55
End: 2025-03-05
Payer: COMMERCIAL

## 2025-03-06 RX ORDER — POLYETHYLENE GLYCOL 3350, SODIUM SULFATE ANHYDROUS, SODIUM BICARBONATE, SODIUM CHLORIDE, POTASSIUM CHLORIDE 236; 22.74; 6.74; 5.86; 2.97 G/4L; G/4L; G/4L; G/4L; G/4L
4 POWDER, FOR SOLUTION ORAL ONCE
Qty: 4000 ML | Refills: 0 | Status: SHIPPED | OUTPATIENT
Start: 2025-03-06 | End: 2025-03-06

## 2025-03-11 ENCOUNTER — RESULTS FOLLOW-UP (OUTPATIENT)
Dept: PRIMARY CARE CLINIC | Facility: CLINIC | Age: 55
End: 2025-03-11

## 2025-03-11 DIAGNOSIS — R91.1 NODULE OF MIDDLE LOBE OF RIGHT LUNG: Primary | ICD-10-CM

## 2025-03-25 ENCOUNTER — TELEPHONE (OUTPATIENT)
Dept: GASTROENTEROLOGY | Facility: CLINIC | Age: 55
End: 2025-03-25
Payer: COMMERCIAL

## 2025-03-25 NOTE — TELEPHONE ENCOUNTER
----- Message from Johnnie Butt sent at 3/25/2025  1:00 PM CDT -----  Contact: pt    ----- Message -----  From: Melanie Head  Sent: 3/25/2025  12:53 PM CDT  To: [place] [facility] Staff    Type:  requesting a call Who Called: pt Would the patient rather a call back or a response via [employer]? Call Best Call Back Number:  [phone] Information: regarding time and instructions for procedure on 03/27

## 2025-03-27 ENCOUNTER — RESULTS FOLLOW-UP (OUTPATIENT)
Dept: PRIMARY CARE CLINIC | Facility: CLINIC | Age: 55
End: 2025-03-27
Payer: COMMERCIAL

## 2025-08-19 ENCOUNTER — TELEPHONE (OUTPATIENT)
Dept: PULMONOLOGY | Facility: CLINIC | Age: 55
End: 2025-08-19
Payer: COMMERCIAL